# Patient Record
Sex: MALE | Race: WHITE | Employment: UNEMPLOYED | URBAN - METROPOLITAN AREA
[De-identification: names, ages, dates, MRNs, and addresses within clinical notes are randomized per-mention and may not be internally consistent; named-entity substitution may affect disease eponyms.]

---

## 2018-01-01 ENCOUNTER — HOSPICE ADMISSION (OUTPATIENT)
Dept: HOSPICE | Facility: HOSPICE | Age: 68
End: 2018-01-01
Payer: MEDICARE

## 2018-01-01 ENCOUNTER — HOSPITAL ENCOUNTER (INPATIENT)
Age: 68
LOS: 6 days | End: 2018-01-14
Attending: INTERNAL MEDICINE | Admitting: INTERNAL MEDICINE

## 2018-01-01 VITALS
HEART RATE: 104 BPM | DIASTOLIC BLOOD PRESSURE: 58 MMHG | HEIGHT: 73 IN | TEMPERATURE: 98.2 F | SYSTOLIC BLOOD PRESSURE: 116 MMHG | RESPIRATION RATE: 23 BRPM | WEIGHT: 176 LBS | BODY MASS INDEX: 23.33 KG/M2

## 2018-01-01 DIAGNOSIS — E08.11 DIABETES MELLITUS DUE TO UNDERLYING CONDITION WITH KETOACIDOTIC COMA, UNSPECIFIED LONG TERM INSULIN USE STATUS: ICD-10-CM

## 2018-01-01 DIAGNOSIS — M72.6 NECROTIZING FASCIITIS (HCC): ICD-10-CM

## 2018-01-01 PROCEDURE — 74011250636 HC RX REV CODE- 250/636: Performed by: NURSE PRACTITIONER

## 2018-01-01 PROCEDURE — 0656 HSPC GENERAL INPATIENT

## 2018-01-01 PROCEDURE — 74011000250 HC RX REV CODE- 250: Performed by: NURSE PRACTITIONER

## 2018-01-01 PROCEDURE — 74011250637 HC RX REV CODE- 250/637: Performed by: NURSE PRACTITIONER

## 2018-01-01 PROCEDURE — 3336500001 HSPC ELECTION

## 2018-01-01 PROCEDURE — 99231 SBSQ HOSP IP/OBS SF/LOW 25: CPT | Performed by: INTERNAL MEDICINE

## 2018-01-01 RX ORDER — METRONIDAZOLE 250 MG/1
1000 TABLET ORAL EVERY OTHER DAY
Status: DISCONTINUED | OUTPATIENT
Start: 2018-01-01 | End: 2018-01-01 | Stop reason: HOSPADM

## 2018-01-01 RX ORDER — HALOPERIDOL 5 MG/ML
2 INJECTION INTRAMUSCULAR
Status: DISCONTINUED | OUTPATIENT
Start: 2018-01-01 | End: 2018-01-01 | Stop reason: HOSPADM

## 2018-01-01 RX ORDER — MORPHINE SULFATE 4 MG/ML
4 INJECTION, SOLUTION INTRAMUSCULAR; INTRAVENOUS
Status: DISCONTINUED | OUTPATIENT
Start: 2018-01-01 | End: 2018-01-01 | Stop reason: HOSPADM

## 2018-01-01 RX ORDER — METRONIDAZOLE 250 MG/1
1000 TABLET ORAL DAILY
Status: DISCONTINUED | OUTPATIENT
Start: 2018-01-01 | End: 2018-01-01

## 2018-01-01 RX ORDER — FACIAL-BODY WIPES
10 EACH TOPICAL AS NEEDED
Status: DISCONTINUED | OUTPATIENT
Start: 2018-01-01 | End: 2018-01-01 | Stop reason: HOSPADM

## 2018-01-01 RX ORDER — METRONIDAZOLE 250 MG/1
1000 TABLET ORAL AS NEEDED
Status: DISCONTINUED | OUTPATIENT
Start: 2018-01-01 | End: 2018-01-01 | Stop reason: HOSPADM

## 2018-01-01 RX ORDER — SODIUM CHLORIDE 0.9 % (FLUSH) 0.9 %
3 SYRINGE (ML) INJECTION EVERY 12 HOURS
Status: DISCONTINUED | OUTPATIENT
Start: 2018-01-01 | End: 2018-01-01 | Stop reason: HOSPADM

## 2018-01-01 RX ORDER — GLYCOPYRROLATE 0.2 MG/ML
0.2 INJECTION INTRAMUSCULAR; INTRAVENOUS
Status: DISCONTINUED | OUTPATIENT
Start: 2018-01-01 | End: 2018-01-01 | Stop reason: HOSPADM

## 2018-01-01 RX ORDER — DOXYLAMINE SUCCINATE 25 MG
TABLET ORAL EVERY OTHER DAY
Status: DISCONTINUED | OUTPATIENT
Start: 2018-01-01 | End: 2018-01-01 | Stop reason: HOSPADM

## 2018-01-01 RX ORDER — SODIUM CHLORIDE 0.9 % (FLUSH) 0.9 %
3 SYRINGE (ML) INJECTION AS NEEDED
Status: DISCONTINUED | OUTPATIENT
Start: 2018-01-01 | End: 2018-01-01 | Stop reason: HOSPADM

## 2018-01-01 RX ORDER — DOXYLAMINE SUCCINATE 25 MG
TABLET ORAL DAILY
Status: DISCONTINUED | OUTPATIENT
Start: 2018-01-01 | End: 2018-01-01

## 2018-01-01 RX ORDER — FENTANYL 12.5 UG/1
1 PATCH TRANSDERMAL
Status: DISCONTINUED | OUTPATIENT
Start: 2018-01-01 | End: 2018-01-01

## 2018-01-01 RX ORDER — FENTANYL 25 UG/1
1 PATCH TRANSDERMAL
Status: DISCONTINUED | OUTPATIENT
Start: 2018-01-01 | End: 2018-01-01 | Stop reason: HOSPADM

## 2018-01-01 RX ORDER — LORAZEPAM 2 MG/ML
1 INJECTION INTRAMUSCULAR
Status: DISCONTINUED | OUTPATIENT
Start: 2018-01-01 | End: 2018-01-01 | Stop reason: HOSPADM

## 2018-01-01 RX ORDER — ACETAMINOPHEN 650 MG/1
650 SUPPOSITORY RECTAL
Status: DISCONTINUED | OUTPATIENT
Start: 2018-01-01 | End: 2018-01-01 | Stop reason: HOSPADM

## 2018-01-01 RX ORDER — DOXYLAMINE SUCCINATE 25 MG
TABLET ORAL AS NEEDED
Status: DISCONTINUED | OUTPATIENT
Start: 2018-01-01 | End: 2018-01-01 | Stop reason: HOSPADM

## 2018-01-01 RX ADMIN — MORPHINE SULFATE 4 MG: 4 INJECTION, SOLUTION INTRAMUSCULAR; INTRAVENOUS at 23:37

## 2018-01-01 RX ADMIN — MORPHINE SULFATE 4 MG: 4 INJECTION, SOLUTION INTRAMUSCULAR; INTRAVENOUS at 04:06

## 2018-01-01 RX ADMIN — Medication 3 ML: at 09:48

## 2018-01-01 RX ADMIN — Medication 3 ML: at 21:10

## 2018-01-01 RX ADMIN — MICONAZOLE NITRATE: 20 CREAM TOPICAL at 09:00

## 2018-01-01 RX ADMIN — Medication 3 ML: at 22:47

## 2018-01-01 RX ADMIN — MORPHINE SULFATE 4 MG: 4 INJECTION, SOLUTION INTRAMUSCULAR; INTRAVENOUS at 03:53

## 2018-01-01 RX ADMIN — Medication 3 ML: at 09:00

## 2018-01-01 RX ADMIN — SODIUM CHLORIDE, PRESERVATIVE FREE 3 ML: 5 INJECTION INTRAVENOUS at 16:46

## 2018-01-01 RX ADMIN — SODIUM CHLORIDE, PRESERVATIVE FREE 3 ML: 5 INJECTION INTRAVENOUS at 03:54

## 2018-01-01 RX ADMIN — METRONIDAZOLE 1000 MG: 250 TABLET ORAL at 09:30

## 2018-01-01 RX ADMIN — MICONAZOLE NITRATE: 20 CREAM TOPICAL at 09:34

## 2018-01-01 RX ADMIN — MICONAZOLE NITRATE: 20 CREAM TOPICAL at 10:00

## 2018-01-01 RX ADMIN — MORPHINE SULFATE 4 MG: 4 INJECTION, SOLUTION INTRAMUSCULAR; INTRAVENOUS at 05:19

## 2018-01-01 RX ADMIN — MORPHINE SULFATE 4 MG: 4 INJECTION, SOLUTION INTRAMUSCULAR; INTRAVENOUS at 03:38

## 2018-01-01 RX ADMIN — MORPHINE SULFATE 4 MG: 4 INJECTION, SOLUTION INTRAMUSCULAR; INTRAVENOUS at 16:45

## 2018-01-01 RX ADMIN — MORPHINE SULFATE 4 MG: 4 INJECTION, SOLUTION INTRAMUSCULAR; INTRAVENOUS at 18:35

## 2018-01-01 RX ADMIN — MORPHINE SULFATE 4 MG: 4 INJECTION, SOLUTION INTRAMUSCULAR; INTRAVENOUS at 04:26

## 2018-01-01 RX ADMIN — MORPHINE SULFATE 4 MG: 4 INJECTION, SOLUTION INTRAMUSCULAR; INTRAVENOUS at 03:16

## 2018-01-01 RX ADMIN — METRONIDAZOLE 1000 MG: 250 TABLET ORAL at 10:02

## 2018-01-01 RX ADMIN — HALOPERIDOL LACTATE 2 MG: 5 INJECTION INTRAMUSCULAR at 16:53

## 2018-01-01 RX ADMIN — MORPHINE SULFATE 4 MG: 4 INJECTION, SOLUTION INTRAMUSCULAR; INTRAVENOUS at 22:13

## 2018-01-01 RX ADMIN — METRONIDAZOLE 1000 MG: 250 TABLET ORAL at 10:08

## 2018-01-01 RX ADMIN — HALOPERIDOL LACTATE 2 MG: 5 INJECTION INTRAMUSCULAR at 09:46

## 2018-01-01 RX ADMIN — MORPHINE SULFATE 4 MG: 4 INJECTION, SOLUTION INTRAMUSCULAR; INTRAVENOUS at 09:46

## 2018-01-01 RX ADMIN — Medication 3 ML: at 09:57

## 2018-01-01 RX ADMIN — Medication 3 ML: at 22:51

## 2018-01-01 RX ADMIN — MORPHINE SULFATE 4 MG: 4 INJECTION, SOLUTION INTRAMUSCULAR; INTRAVENOUS at 16:53

## 2018-01-01 RX ADMIN — SODIUM CHLORIDE, PRESERVATIVE FREE 3 ML: 5 INJECTION INTRAVENOUS at 18:10

## 2018-01-01 RX ADMIN — METRONIDAZOLE 1000 MG: 250 TABLET ORAL at 09:47

## 2018-01-01 RX ADMIN — SODIUM CHLORIDE, PRESERVATIVE FREE 3 ML: 5 INJECTION INTRAVENOUS at 18:36

## 2018-01-01 RX ADMIN — MORPHINE SULFATE 4 MG: 4 INJECTION, SOLUTION INTRAMUSCULAR; INTRAVENOUS at 18:10

## 2018-01-01 RX ADMIN — MORPHINE SULFATE 4 MG: 4 INJECTION, SOLUTION INTRAMUSCULAR; INTRAVENOUS at 05:20

## 2018-01-01 RX ADMIN — HALOPERIDOL LACTATE 2 MG: 5 INJECTION INTRAMUSCULAR at 16:45

## 2018-01-01 RX ADMIN — Medication 3 ML: at 20:23

## 2018-01-01 RX ADMIN — MORPHINE SULFATE 4 MG: 4 INJECTION, SOLUTION INTRAMUSCULAR; INTRAVENOUS at 06:06

## 2018-01-01 RX ADMIN — Medication 3 ML: at 09:37

## 2018-01-01 RX ADMIN — Medication 3 ML: at 21:00

## 2018-01-01 RX ADMIN — Medication 3 ML: at 20:31

## 2018-01-08 PROBLEM — M72.6 NECROTIZING FASCIITIS (HCC): Status: ACTIVE | Noted: 2018-01-01

## 2018-01-09 NOTE — PROGRESS NOTES
Problem: Imminent death  Goal: Collaborate with patient/family/caregiver/interdisciplinary team to minimize and manage end of life symptoms  Outcome: Progressing Towards Goal  Pt/Family have been instructed on the dying process, the associated sings and symptoms, strategies to reduce associated stress of the dying process, and what to do at time of death. Family has made appropriate arrangements and is grieving appropriately. Blue book provided and all interventions below have been reviewed.

## 2018-01-09 NOTE — H&P
History and Physical    Patient: Scottie Hercules MRN: [id number]  SSN: [id number]    YOB: 1950  Age: 74 y.o. Sex: male      Subjective:      Scottie Hercules is a 74 y.o. male who sustained a a left brain CVA in October of 2017 that resulted in significant functional and cognitive deficits resulting in right hemiparesis, dysphagia (requiring a PEG tube), and aphasia. He has been living at a nursing home where her acquired a right trochanteric decubitus stage III ulcer and was being followed at [facility] wound center. He was sent to the hospital for further evaluation once there was air and fluid pockets in the wound extending into his thigh. Antibiotic therapy was started but per his brother and sister in [place] wishes they were stopped given that without surgical intervention for which he is not a surgical candidate, the abx were futile. As such, he has been admitted GIP for dx of right hip/thigh necrotizing fasciitis for management of pain, dyspnea, agitation, wound care, and family/pt support. Past Medical History:   Diagnosis Date    CAD (coronary artery disease)     Congestive heart failure, unspecified     Dental disorder     Diabetes ([address]     Heart failure ([facility] [facility]     Hypertension     Ill-defined condition     mitral regurg     Past Surgical History:   Procedure Laterality Date    HX HEART CATHETERIZATION  6/11/2014    multi-vessel with re-evaluate for CABG in future      Family History   Problem Relation Age of Onset    Cancer Father     Stroke Brother     Heart Attack Paternal Grandfather     Heart Disease Paternal Grandfather      Social History   Substance Use Topics    Smoking status: Former Smoker     Packs/day: 1.00     Years: 40.00     Quit date: 1/1/2014    Smokeless tobacco: Former User     Quit date: 6/21/2014    Alcohol use No      Prior to Admission medications    Medication Sig Start Date End Date Taking?  Authorizing Provider   amLODIPine (NORVASC) 5 mg tablet Take 5 mg by mouth daily. Historical Provider   SITagliptin (JANUVIA) 50 mg tablet Take 50 mg by mouth daily. Historical Provider   rosuvastatin (CRESTOR) 20 mg tablet Take 20 mg by mouth nightly. Historical Provider   glimepiride (AMARYL) 4 mg tablet Take 4 mg by mouth every morning. Historical Provider   insulin detemir (LEVEMIR FLEXTOUCH) 100 unit/mL (3 mL) inpn 17 Units by SubCUTAneous route nightly. Historical Provider   carvedilol (COREG) 25 mg tablet Take 25 mg by mouth two (2) times daily (with meals). Historical Provider   furosemide (LASIX) 80 mg tablet 1 po daily 6/13/14   FERCHO Katz   lisinopril (PRINIVIL, ZESTRIL) 10 mg tablet Take 1 Tab by mouth daily. Patient taking differently: Take 40 mg by mouth daily. 6/13/14   FERCHO Katz   nitroglycerin (NITROSTAT) 0.4 mg SL tablet 1 Tab by SubLINGual route every five (5) minutes as needed for Chest Pain. 6/13/14   FERCHO Katz   aspirin (ASPIRIN) 325 mg tablet Take 325 mg by mouth daily. Phys MD Ed        No Known Allergies    Review of Systems:  Review of systems not obtained due to patient factors. Objective: There were no vitals filed for this visit. Physical Exam:  GENERAL: mild distress, toxic, cachectic  LUNG: diminished breath sounds anterior  HEART: regular rate and rhythm  ABDOMEN: soft, non-tender. Bowel sounds normal. No masses,  no organomegaly  EXTREMITIES:  extremities normal, atraumatic, no cyanosis or edema x3. Right upper leg/thigh grossly edematous with foul smelling wound and copious drainage with crepitus on palpation  SKIN: Multiple areas of skin breakdown to buttock, heel, sacrum. See NN for measurements. NEUROLOGIC: Poorly responsive. Unable to determine orientation. Does not participate in exam. Does not respond to pain.        Assessment:     Hospital Problems  Date Reviewed: 1/8/2018          Codes Class Noted POA    * (Principal)Necrotizing fasciitis ([facility] [address] ICD-10-CM: M72.6  ICD-9-CM: 728.86  1/8/2018 Yes    Overview Signed 1/8/2018  5:44 PM by Mark Vyas NP     Right hip/thigh. Plan:     Current Facility-Administered Medications   Medication Dose Route Frequency    sodium chloride (NS) flush 3 mL  3 mL IntraVENous Q12H    sodium chloride (NS) flush 3 mL  3 mL IntraVENous PRN    morphine injection 4 mg  4 mg SubCUTAneous Q20MIN PRN    Or    morphine injection 4 mg  4 mg IntraVENous Q20MIN PRN    acetaminophen (TYLENOL) suppository 650 mg  650 mg Rectal Q3H PRN    LORazepam (ATIVAN) injection 1 mg  1 mg IntraVENous Q4H PRN    bisacodyl (DULCOLAX) suppository 10 mg  10 mg Rectal PRN    haloperidol lactate (HALDOL) injection 2 mg  2 mg SubCUTAneous Q1H PRN    Or    haloperidol lactate (HALDOL) injection 2 mg  2 mg IntraVENous Q1H PRN    glycopyrrolate (ROBINUL) injection 0.2 mg  0.2 mg IntraVENous Q4H PRN     1. Admitted GIP for dx of right hip/thigh necrotizing fasciitis for management of pain, dyspnea, agitation, wound care, and family/pt support. 2. Pain: Morphine 4 mg SQ or IV PRN. 3. Dyspnea: Morphine as indicated above. Oxygen PRN. 4. Agitation: Haldol IV or SQ PRN. 5. Wound care: as ordered. 6. Family/pt support: No family at bedside. Discussed plan of care including meds at bedside with primary RN. Continue to monitor and palliate symptoms as they arise. PPS 10%.        Signed By: Mark Vyas NP     January 8, 2018

## 2018-01-09 NOTE — PROGRESS NOTES
01/09/18 1348   Pyschosocial Assessment 1   Concerns from previous vist? No  (initial SW assessment)   Significant changes in relationships or household members? No   Signs of abuse or neglect? No   Financial Need Medicaid Application;Community Resources  (Pt lived an indigent life according to his brother )   Pain signs needing to be reported to  No   Psychosocial Components/Teaching/Interventions Emotional support/supportive listening;Provided requested forms/applications; Instructed on how to contact the agency with concerns; Other (comment)  (community resources )   The patient has designated their health care surrogate Patient has not made wishes known  (NO HCPOA on file )     Visit Environment: LMSW received a phone call from Roderick Lundborg the pt's brother. He and I completed the SW assessment over the phone. Circumstances for Admission: Pt was admitted from [facility] on 1-8-18. He was transported via ambulance to room 123. Please see physician statement below:    DX: necrotizing fasciitis of the right leg  ASSOCIATED: sepsis, fever, leukocytosis, Stage III decubitus right trochanter, acute renal failure    NON-ASSOCIATED: atherosclerotic heart disease, CHF, hypertension, insulin requiring Type II diabetes, sequelae of left MCA stroke, left hemiparesis, dysphagia, PEG tube feeding and hydration dependent  Nina Marshall: This is an order to admit to in patient hospice care, for a first 90 day benefit interval, a 74year old man who is suffering necrotizing fasciitis and sepsis as a delayed complication of left brain stroke in October of 2017. The patients right hemiparesis and aphasia and dysphagia have necessitated residential care at a nursing home. He acquired a right trochanteric decubitus Stage III and has been following at the [facility] wound center. Exam during routine wound center follow-up on 1/4/2018 demonstrated tunneling from the margins of the trochanteric ulcer.  He was transferred to [facility] where imaging studies demonstrated air and fluid pockets in the subcutaneous areas and fascial plane. He was started aggressive broad spectrum antibiotic therapy; however, antibiotics were stopped (1/7/2018) when patients brother and sister in law as substitute decision makers recognized that antibiotics without surgery was a futile intervention. The patient is not a surgical candidate due to recent stroke, debility and history of CAD. Insulin dependent diabetes, acute renal failure, hyperkalemia, PEG feeding dependent are diagnoses apart from necrotizing fasciitis with associated sepsis and multi organ failure which none the less contributed adversely to his poor prognosis. Off antibiotic therapy and artificial hydration and nutrition this patients life expectancy is less than 5 days. He is continuing to require parenteral opioid for pain and dyspnea as well as psychotropic medication for agitation. Patient's Previous Hospice Care: This is the pt's 1st hospice benefit period. 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 He has a psychology degree and one day he just quit his job and according to his brother Ekaterina Espinal" he lived and indigent life. \"      Spiritual Assessment: [address] for the Past Year and [place] of Care/Primary Caregivers: Pt's brother took him in so he would not be homeless. Prior to admission he was at a Nursing home for rehab for his stroke he had in October 2017. Patient's Cognitive  And Emotional Status Pt is obtunded         Advance Directives and DNR Status Pt has no HCPOA on file but he is a DNR family needed resources for direct cremation Family chose [place]     Risk of Harm: The patient is not a harm to himself or others. Bereavement Risk Assessment, Tracy Farfan is very matter of fact and he seems to be coping well. Low risk       Plan for patient. There are no D/C plans for the pt at this time.  Pt is nearing his demise.

## 2018-01-09 NOTE — PROGRESS NOTES
Received report from off going RN and assumed care of patient. Patient identified by name and . Patient in bed resting with eyes closed. No s/sx of distress noted at this time. This nurse not present when patient arrived to unit. Assessment in progress.

## 2018-01-09 NOTE — PROGRESS NOTES
Problem: Falls - Risk of  Goal: *Absence of Falls  Document Keila Fall Risk and appropriate interventions in the flowsheet.    Outcome: Progressing Towards Goal  Fall Risk Interventions:            Medication Interventions: Bed/chair exit alarm    Elimination Interventions: Bed/chair exit alarm, Call light in reach

## 2018-01-09 NOTE — PROGRESS NOTES
Pt identified. Pt in bed with eyes closed. Pt displaying no signs or symptoms of pain, dyspnea, agitation,nausea, or vomiting. Flacc 0/10. Bed locked and low, side rails up, tabs/bed alarm in place, call light with in reach, and door opened for continued monitoring. Pt repositioned and brief checked or changed.

## 2018-01-10 NOTE — PROGRESS NOTES
ID, bedside report. FLACC 0.  Rr 16, easy. Skin flushed, warm. Dressing right hip dry, intact. Unaccompanied. 810- Assessment, ESAS, justino and fall risk completed at this time. Positioned for comfort. All safety/fall precautions continued as per previous and assessment completed. Waller is patent. IV flushes easily. Peg dressing is dry, intact. He is unaccompanied. He has a large DTI on the buttocks over the gluteal crease. This measures 5.5 cm L x 3 cm W.  Skin intact. 930- Restful. He does not respond to turning at this time, or oral care. He has considerable yellow drainage from his left eye, which is cleansed with warm, wet cloth. He tolerates this well. 1105- Repositioned. Dressing changed, wound care. The wound has moderate amount creamy yellow, foul smelling discharge. Wound care as per order, and he tolerates this without any response. Positioned on his left side. Unaccompanied. 1235- Restful. FLACc 0.  Skin warm, dry. There remains considerable crepitus to right upper leg. He does not respond to palpation of this area. Cap refill < 3 seconds. + pedal pulses bilateral.     1410- Restful. FLACC 0.  Rr 16, easy. He does not respond to voice, touch. 1610- FLACC 0. Skin warm, dry. RR 16, easy. Unaccompanied. 1700- Family distressed as patient states she has pain. Dr. Spann Gains contacted for orders, rec'd.      1094- Pt continues to c/o pain, cannot state where. She is restless, RR 28. Flushed and moaning, grimacing. Family distressed but reassured and patient medicated, repositioned. 1810- Restful. FLACC 0.  RR 16, easy. Unaccompanied. I verify that a minimum of hourly rounds have been provided for this patient during this shift.

## 2018-01-10 NOTE — PROGRESS NOTES
Progress Note    Patient: Miriam Kurtz MRN: [id number]  SSN: [id number]    YOB: 1950  Age: 74 y.o. Sex: male      Admit Date: 1/8/2018    LOS: 2 days     Clinical Summary:     Tori Jarrett has recently been sedated and is poorly responsive. His wound continues to deteriorate but is less odiferous. Very little eating and drinking. No other changes in her breast examination. Vitals:    01/08/18 2112 01/09/18 0511 01/09/18 1648 01/10/18 0423   BP: 95/54 96/58 (!) 88/53 103/58   Pulse: (!) 119 (!) 119 (!) 113 (!) 110   Resp: 26 26 18 20   Temp: (!) 101.9 °F (38.8 °C) 99.9 °F (37.7 °C) 98.3 °F (36.8 °C) 98.2 °F (36.8 °C)   Weight:       Height:                Clinical Assessment:     Principal Problem:    Necrotizing fasciitis (HCC) (1/8/2018)      Overview: Right hip/thigh. Treatment Plan:      I have reviewed the patient's Plan of Care with the nursing staff. we will continue his comfort measures and no changes in his plan of treatment are required today. It is likely this week.           Current Facility-Administered Medications   Medication Dose Route Frequency    fentaNYL (DURAGESIC) 12 mcg/hr patch 1 Patch  1 Patch TransDERmal Q72H    metroNIDAZOLE (FLAGYL) tablet 1,000 mg  1,000 mg Topical DAILY    And    miconazole (MICOTIN) 2 % cream   Topical DAILY    metroNIDAZOLE (FLAGYL) tablet 1,000 mg  1,000 mg Topical PRN    And    miconazole (MICOTIN) 2 % cream   Topical PRN    sodium chloride (NS) flush 3 mL  3 mL IntraVENous Q12H    sodium chloride (NS) flush 3 mL  3 mL IntraVENous PRN    morphine injection 4 mg  4 mg SubCUTAneous Q20MIN PRN    Or    morphine injection 4 mg  4 mg IntraVENous Q20MIN PRN    acetaminophen (TYLENOL) suppository 650 mg  650 mg Rectal Q3H PRN    LORazepam (ATIVAN) injection 1 mg  1 mg IntraVENous Q4H PRN    bisacodyl (DULCOLAX) suppository 10 mg  10 mg Rectal PRN    haloperidol lactate (HALDOL) injection 2 mg  2 mg SubCUTAneous Q1H PRN    Or    haloperidol lactate (HALDOL) injection 2 mg  2 mg IntraVENous Q1H PRN    glycopyrrolate (ROBINUL) injection 0.2 mg  0.2 mg IntraVENous Q4H PRN           Signed By: Kamille Lyon MD     January 10, 2018

## 2018-01-10 NOTE — PROGRESS NOTES
STALIN visited with the pt in his room. He was asleep and resting comfortable at the time of my visit. LMSW provided a calm presence in his room. He did not wake during my visit.

## 2018-01-11 NOTE — PROGRESS NOTES
Situation: Francis Garzon is a 74year old male who  suffered  a left brain CVA in October of 2017 that resulted in significant functional and cognitive deficits resulting in right hemiparesis, dysphagia (requiring a PEG tube), and aphasia. He has been living at a nursing home where her acquired a right trochanteric decubitus stage III ulcer and was being followed at [facility] wound center. He was sent to the hospital for further evaluation once there was air and fluid pockets in the wound extending into his thigh. Antibiotic therapy was started but per his brother and sister in [place] wishes they were stopped given that without surgical intervention for which he is not a surgical candidate, the abx were futile. He has admitted GIP for diagnosis of right hip/thigh necrotizing fasciitis for management of pain, dyspnea, agitation, wound care, and family/pt support.      Background: According to chart records patient is a [place]. He is supported by his brother and sister in law. His chart also list another relative, Trisha Sheets. Assessment:  offered the ministry of presence and prayer.  attempted to contact Roderick Lundborg via telephone. ( listed as other relative on the chart. Recommendation of Care:   to provide spiritual support in the form of a caring presence.  will continue to provide spiritual rituals: Prayer and scripture.  will try to reach family again via phone.

## 2018-01-11 NOTE — PROGRESS NOTES
Pt re-postitioned for comfort. No s/sx of pain, dyspnea or N/V noted at this time. Bed low and locked, SR up X 2, door open for monitoring.

## 2018-01-11 NOTE — PROGRESS NOTES
8202- The report was taken from the off going RN and walking rounds are completed. The patient was identified by name and . He shows no signs of anxiety, SOB, nausea / vomit or pain at this time. The following safety precautions are in effect: Bed low and locked, bed rails up times tab alerts in place, room close to the nurses station and the door to the room left open as patient is alone. Will continue to monitor. 0259- AM medications given. AM assessments completed. Patient continues to rest with no signs of distress. 1145- The patient continues to rest with no signs of distress observed. No signs of pain, no signs of anxiety and no signs of SOB or nausea / vomit. Will continue to monitor. 1400- The patient continues to rest with no s /sx of distress observed. No signs of anxiety, SOB, nausea/vomit or pain observed. Turned for comfort. All safety precautions remain in place. Will continue to monitor. 1600- The patient was turned and repositioned for comfort. He shows no signs of distress. He is obtunded. 1645- The patient was medicated with Morphine 4 mg for pain at dressing change and Haldol 2 mg for agitation with dressing change. Medicated IV and flushed as ordered. Old dressing removed and new dressing replaced as ordered. Will continue to monitor. Will report off to the on coming RN and completed walking rounds.

## 2018-01-11 NOTE — PROGRESS NOTES
Received report from off-going RN. Pt ID. Pt resting quietly in bed with no s/sx of pain, dyspnea, agitation or N/V at this time. Bed low and locked, SR x 2, door open for monitoring.

## 2018-01-12 NOTE — HSPC IDG CHAPLAIN NOTES
Patient: Fior Weir    Date: 01/12/18  Time: 1:26 PM    [facility]  Notes  Intervention:  Ministry of presence and prayer. Attempted to contact family. Outcome: Left message with family member via voice mail. No response as of yet. Patient is unresponsive. Plan of Care:  continues to be available for spiritual and emotional support.     Signed by: Suad Franklin

## 2018-01-12 NOTE — HSPC IDG VOLUNTEER NOTES
[address] Review     Status Codes I = Initiated C=Continued R=Revised RS = Resolved     I.  Volunteer     Goal: Hospice house volunteer (s) enhances the quality of remaining life while patient is at the hospice house. Interventions: Maico Webb Volunteer (s) will provide companionship to the patient and/or family by visiting at the hospice house       . Maico Webb Volunteer (s) will provide respite as needed when requested by patient and/or family. Maico Fragoso  Volunteer will provide activities such as music, reading, pet therapy, etc. as requested. Maico Bess Colace  Comfort bag delivered. Any other special requests or information regarding volunteer services:    Staff have requested extra visits for companionship and volunteers have been notified. No further needs identified at this time. These notes have been discussed in [facility] meeting.         Signed by: Ruddy Hyde

## 2018-01-12 NOTE — HSPC IDG NURSE NOTES
Patient: Blake Valente    Date: 01/12/18  Time: 1:33 PM    [facility] Nurse Notes    UPDATE: Patient is a 74year old Male patient, with diagnosis of Necrotizing Fascititis, on GIP level of care for Pain, Dyspnea, Agitation and Wound Management. Dressing changed to every other day to Right Trochanter. GOALS OF CARE:   Continue to monitor for optimal patient comfort and symptom management.              Signed by: Merari Centeno RN MSN

## 2018-01-12 NOTE — HSPC IDG SOCIAL WORKER NOTES
Patient: Mynor Dudley    Date: 01/12/18  Time: 1:30 PM    [facility]  Notes    LMSW will continue to provide emotional support. Pt seems to be unaccompanied a lot. One brother is in [place] caring for their mother. He will be back at the end of January.       Signed by: Ha Nixon

## 2018-01-12 NOTE — PROGRESS NOTES
0650-The shift change report taken and walking rounds completed. Patient identified by name and . The patient shows no signs of anxiety, SOB, nausea/vomit or pain. The following safety precautions are in place: Side rails up times 2, bed low and locked, tab alert in place and the door to the patient's room left open when patient is alone. Will continue to monitor. 0900- The patient is resting quietly and shows no signs of distress at this time. Mouth care completed and wounds creamed. Patient was repositioned to the right side with pillows used for positioning. Will continue to monitor. 1130- The patient continues to rest without responding. He continues to show no signs of distress. Will continue to monitor. Repositioned by to supine position. Safety precautions remain in place. 1400- The patient was turned and repositioned in the bed for comfort. He shows no signs of distress. No response to being repositioned. He is on the left side. Creamed his buttocks, back and heels with cream as ordered. 1700- The patient was repositioned for comfort. He continues to show no signs of distress. Will report off to the on coming RN and will complete walking rounds.

## 2018-01-13 NOTE — PROGRESS NOTES
1837- The shift change rounds completed and walking rounds completed. The patient was identified by name and . He shows no signs of distress. No signs of pain, SOB, nausea/vomit or anxiety. The bed is low and locked and the door to the room is open. Bed rails are up times two and the tab alert is in place. Will continue to monitor. 0800- All AM assessments are completed. The patient remains lethargic but not obtunded at this time. Will continue to monitor. 0945- The patient is moaning with respirations increased to 30 / minute. Administered Haldol IV for restlessness and Morphine IV of increased respirations. Incontinent bowel movement loose. Cleaned robert area and repositioned. Repositioned for comfort. Changed dressing to right thigh as ordered. Patient tolerated well. Will continue to monitor. 1005- The patient is now resting quietly in the bed with no signs of SOB or restlessness present. Respirations are at 15 minute now. 1300- Turned and repositioned in the bed. No signs of anxiety, SOB, nausea/vomit or pain. Did not respond at all to repositioning. 1545- No changes. Remains unresponsive. No signs of distress observed. Repositioned for comfort. Mouth care completed. 1810- Medicated for dyspnea and repositioned in the bed for comfort. Did not respond. Cleaned mouth and eyes. Eyes have green drainage. 1835- Morphine administered again for dyspnea. Will continue to monitor. Reported off to the on coming RN and walking rounds completed.

## 2018-01-13 NOTE — PROGRESS NOTES
Remains minimally responsive this am. Required two doses of Morphine IV overnight for dyspnea. Meds effective. Had bath last pm with buttocks,heels and back creamed as ordered. Repositioned throughout the night for comfort and pressure offsetting. Bed in low and locked position with door remaining open as pt is unaccompanied.

## 2018-01-13 NOTE — HOSPICE
Rounding preformed report received from Nurse [facility] reviewed. History and physical reviewed. Patient with increased need for medication for pain last night. But has responded better this morning. No changes in current regimen will continue to palliate. No family today.      MARIVEL Quispe

## 2018-01-14 NOTE — PROGRESS NOTES
Problem: Falls - Risk of  Goal: *Absence of Falls  Document Keila Fall Risk and appropriate interventions in the flowsheet.    Outcome: Progressing Towards Goal  Fall Risk Interventions:            Medication Interventions: Bed/chair exit alarm    Elimination Interventions: Bed/chair exit alarm, Call light in reach, Toileting schedule/hourly rounds    History of Falls Interventions: Bed/chair exit alarm, Door open when patient unattended

## 2018-01-15 NOTE — [facility] IDG MASTER NOTE
Hospice Interdisciplinary Group Collaborative  Date: 01/12/2018  Time: 1300 pm    ___________________    Patient: Bao Galloway  Insurance ID: [unfilled]  MRN: [id number]        ___________________    Diagnoses:  Diagnoses of Necrotizing fasciitis ([facility] and Diabetes mellitus due to underlying condition with ketoacidotic coma, unspecified long term insulin use status ([facility] were pertinent to this visit. Current Medications:  No current facility-administered medications for this encounter. Current Outpatient Prescriptions:     amLODIPine (NORVASC) 5 mg tablet, Take 5 mg by mouth daily. , Disp: , Rfl:     SITagliptin (JANUVIA) 50 mg tablet, Take 50 mg by mouth daily. , Disp: , Rfl:     rosuvastatin (CRESTOR) 20 mg tablet, Take 20 mg by mouth nightly., Disp: , Rfl:     glimepiride (AMARYL) 4 mg tablet, Take 4 mg by mouth every morning., Disp: , Rfl:     insulin detemir (LEVEMIR FLEXTOUCH) 100 unit/mL (3 mL) inpn, 17 Units by SubCUTAneous route nightly., Disp: , Rfl:     carvedilol (COREG) 25 mg tablet, Take 25 mg by mouth two (2) times daily (with meals). , Disp: , Rfl:     furosemide (LASIX) 80 mg tablet, 1 po daily, Disp: 30 Tab, Rfl: 0    lisinopril (PRINIVIL, ZESTRIL) 10 mg tablet, Take 1 Tab by mouth daily. (Patient taking differently: Take 40 mg by mouth daily.), Disp: 30 Tab, Rfl: 0    nitroglycerin (NITROSTAT) 0.4 mg SL tablet, 1 Tab by SubLINGual route every five (5) minutes as needed for Chest Pain., Disp: 1 Bottle, Rfl: 0    aspirin (ASPIRIN) 325 mg tablet, Take 325 mg by mouth daily. , Disp: , Rfl:     Orders:  Orders Placed This Encounter    IP CONSULT TO SPIRITUAL CARE     Standing Status:   Standing     Number of Occurrences:   1     Order Specific Question:   Reason for Consult: Answer: Once on week one, then PRN. For [facility] Patients Only. For contracted patients, their primary hospice will continue to manage spiritual care needs.     DISCONTD: sodium chloride (NS) flush 3 mL  DISCONTD: sodium chloride (NS) flush 3 mL    DISCONTD: morphine injection 4 mg    DISCONTD: morphine injection 4 mg    DISCONTD: acetaminophen (TYLENOL) suppository 650 mg    DISCONTD: LORazepam (ATIVAN) injection 1 mg    DISCONTD: bisacodyl (DULCOLAX) suppository 10 mg    DISCONTD: haloperidol lactate (HALDOL) injection 2 mg    DISCONTD: haloperidol lactate (HALDOL) injection 2 mg    DISCONTD: glycopyrrolate (ROBINUL) injection 0.2 mg    DISCONTD: fentaNYL (DURAGESIC) 12 mcg/hr patch 1 Patch    DISCONTD: metroNIDAZOLE (FLAGYL) tablet 1,000 mg     Order Specific Question:   Antibiotic Indications     Answer: Other     Order Specific Question:   Other Abx Indication     Answer:   necrotizing fasciitis    DISCONTD: miconazole (MICOTIN) 2 % cream    DISCONTD: metroNIDAZOLE (FLAGYL) tablet 1,000 mg     Order Specific Question:   Antibiotic Indications     Answer: Other     Order Specific Question:   Other Abx Indication     Answer:   necrotizing fasciitis    DISCONTD: miconazole (MICOTIN) 2 % cream    DISCONTD: metroNIDAZOLE (FLAGYL) tablet 1,000 mg     Order Specific Question:   Antibiotic Indications     Answer: Other     Order Specific Question:   Other Abx Indication     Answer:   necrotizing fasciitis    DISCONTD: miconazole (MICOTIN) 2 % cream    DISCONTD: fentaNYL (DURAGESIC) 25 mcg/hr patch 1 Patch     Place new today    INITIAL PHYSICIAN ORDER: HOSPICE Level Of Care: General; Reason for Admission: Admit GIP for dx of right hip/thigh necrotizing fasciitis for management of pain, dyspnea, agitation, and family/pt support.      Standing Status:   Standing     Number of Occurrences:   1     Order Specific Question:   Status     Answer:   Hospice     Order Specific Question:   Level Of Care     Answer:   General     Order Specific Question:   Reason for Admission     Answer:   Admit GIP for dx of right hip/thigh necrotizing fasciitis for management of pain, dyspnea, agitation, and family/pt support. Order Specific Question:   Inpatient Hospitalization Certified Necessary for the Following Reasons     Answer:   3. Patient receiving treatment that can only be provided in an inpatient setting (further clarification in H&P documentation)     Order Specific Question:   Admitting Diagnosis     Answer:   Necrotizing fasciitis ([facility] [728.86. ICD-9-CM]     Order Specific Question:   Terminal Prognosis Diagnosis(es)     Answer:   Pain [057158]     Order Specific Question:   Terminal Prognosis Diagnosis(es)     Answer:   Agitation [493270]     Order Specific Question:   Admitting Physician     Answer:   Florence Cummings     Order Specific Question:   Attending Physician     Answer:   Mallshilo Patches     Order Specific Question:   Discharge Plan:     Answer: Other (Specify)     Comments:   Anticipate demise at [facility]. Allergies:  No Known Allergies    Care Plan:       Multidisciplinary Problems (Active)           Problem: Communication Deficit     Dates: Start: 01/09/18       Disciplines: Interdisciplinary    Goal: Effectively communicate symptoms, needs, and concerns     Dates: Start: 01/09/18   Expected End: 01/31/18      Disciplines: Interdisciplinary   Intervention: Assess spiritual needs    Dates: Start: 01/09/18      Intervention: Expression of emotions    Dates: Start: 01/09/18      Intervention: Instruct end of life support    Dates: Start: 01/09/18       Description: Assess mental status and identify loss of short term memory, confusion, impaired cognitive ability, alertness, and orientation.           Problem: Community Resource Needs     Dates: Start: 01/09/18       Disciplines: Interdisciplinary    Goal: Patient is receiving increases resource supprot to enhance ability to remain at home     Dates: Start: 01/09/18   Expected End: 01/31/18      Disciplines: Interdisciplinary   Intervention: Provides assistance with identifying appropriate community resources    Dates: Start: 18       Description: Provide assistance with identifying appropriate community resources. Patient is receiving increased resource support to enhance ability to remain at home. Problem: Emotional Support Needs     Dates: Start: 18       Disciplines: Interdisciplinary    Goal: Patient/family is receiving emotional support     Dates: Start: 18   Expected End: 18      Disciplines: Interdisciplinary   Intervention: Assess for emotional distress    Dates: Start: 18      Intervention: Provide emotional support    Dates: Start: 18      Intervention: Provide emotional support of the family's cultural expressions of grief and loss    Dates: Start: 18       Description: Provide emotional support of the family's cultural expression of grief, loss, and response to illness. Problem: Falls - Risk of     Dates: Start: 18       Disciplines: Interdisciplinary    Goal: *Absence of Falls     Dates: Start: 18   Expected End: 18      Description: Document Susana Andrade Fall Risk and appropriate interventions in the flowsheet. Disciplines: Interdisciplinary         Problem: Imminent death     Dates: Start: 18       Disciplines: Interdisciplinary    Goal: Collaborate with patient/family/caregiver/interdisciplinary team to minimize and manage end of life symptoms     Dates: Start: 18   Expected End: 18      Disciplines: Interdisciplinary   Intervention: Attend  or visitation    Dates: Start: 18      Intervention: Nik Nevarez on end of life issues    Dates: Start: 18       Description: Nik Nevarez patient/caregiver on end of life issues. Intervention: Instruct on impending death    Dates: Start: 18       Description: Instruct on impending death, need for  arrangements, physical care, patient wishes, hospice role, and signs/symptoms of approaching death.     Intervention: Instruct on what to do at death    Dates: Start: 01/09/18       Description: Instruct caregiver on what to do at time of death. Intervention: Plan for death    Dates: Start: 01/09/18       Description: Assess patient/caregiver emotional readiness and plan for death and assist as needed.           Problem: Pain     Dates: Start: 01/14/18       Disciplines: Nurse, Interdisciplinary, RT    Goal: *Control of Pain     Dates: Start: 01/14/18      Disciplines: Nurse, Interdisciplinary, RT   Intervention: Assess pain characteristics (eg: Intensity scale; onset; location; quality; severity; duration; frequency; radiation)    Dates: Start: 01/14/18      Intervention: Assess pain management - barriers (eg: Past pain experiences)    Dates: Start: 01/14/18      Intervention: Identify pain expectations (eg: Patient's pain goal; somatic experiences; behavioral changes; affect)    Dates: Start: 01/14/18      Intervention: Identify pain medication concerns (eg: Cultural considerations; addiction concerns)    Dates: Start: 01/14/18      Intervention: Support system identification (eg: Caregiver; community resource; family; friends; Druze; support group)    Dates: Start: 01/14/18      Intervention: Monitor for change in patient condition (eg:  Vital signs changes; changes in level of consciousness; nausea; behavioral changes)    Dates: Start: 01/14/18      Intervention: Medication side-effect assessment    Dates: Start: 01/14/18      Intervention: Pain-relief response reassessment (eg: Frequency based on route of administration; effectiveness)    Dates: Start: 01/14/18          Goal: *PALLIATIVE CARE:  Alleviation of Pain     Dates: Start: 01/14/18      Disciplines: Nurse, Interdisciplinary, RT   Intervention: Refer patient for holistic services per facility    Dates: Start: 01/14/18            Problem: Patient Education: Go to Patient Education Activity     Dates: Start: 01/08/18       Disciplines: Interdisciplinary    Goal: Patient/Family Education     Dates: Start: 01/08/18 Expected End: 01/16/18      Disciplines: Interdisciplinary         Problem: Patient Education: Go to Patient Education Activity     Dates: Start: 01/08/18       Disciplines: Interdisciplinary    Goal: Patient/Family Education     Dates: Start: 01/08/18   Expected End: 01/16/18      Disciplines: Interdisciplinary         Problem: Patient Education: Go to Patient Education Activity     Dates: Start: 01/14/18       Disciplines: Nurse, Interdisciplinary, RT    Goal: Patient/Family Education     Dates: Start: 01/14/18      Disciplines: Nurse, Interdisciplinary, RT         Problem: Pressure Injury - Risk of     Dates: Start: 01/08/18       Disciplines: Interdisciplinary    Goal: *Prevention of pressure ulcer     Dates: Start: 01/08/18   Expected End: 01/16/18      Disciplines: Interdisciplinary   Intervention: Pressure injury risk assessment tool    Dates: Start: 01/08/18       Description: (e.g.: Moses Scale)    Intervention: Pressure-relieving device needs assessment    Dates: Start: 01/08/18      Intervention: Pressure-relieving device implementation (eg: Specialty beds; wheel chair cushions; heel lift boots)    Dates: Start: 01/08/18      Intervention: Skin assessment (e.g. existing ulcers, color; integrity; moisture; kristen prominences; blisters; friction/shear injuries)    Dates: Start: 01/08/18      Intervention: Skin monitoring and care (e.g. skin cleansing; keep dry, moisturize, utilization of  lotion and/or skin barrier cream)    Dates: Start: 01/08/18      Intervention: Blood glucose control (eg: Monitoring; medication; nutrition/hydration)    Dates: Start: 01/08/18      Intervention: Position change (eg: Techniques to position; turn and transfer per schedule; cushion kristen prominences; float heels; encourage mobility)    Dates: Start: 01/08/18      Intervention: Nutrition promotion (eg: Micro/macro nutrient supplementation; encourage oral intake; blood glucose control; nutrition support when indicated) Dates: Start: 01/08/18              ___________________    Care Team Notes          POC/IDG Notes      [facility] IDG Nurse Notes by Ge Heath at 01/12/18 1333  Version 1 of 1    Author:  Conception Ivana Service:  Ayde Salas Author Type:  Registered Nurse    Filed:  01/12/18 1338 Date of Service:  01/12/18 1333 Status:  Signed    :  Conception Ivana (Registered Nurse)           Patient: Marilee Space    Date: 01/12/18  Time: 1:33 PM    [facility] Nurse Notes    UPDATE: Patient is a 74year old Male patient, with diagnosis of Necrotizing Fascititis, on [facility] level of care for Pain, Dyspnea, Agitation and Wound Management. Dressing changed to every other day to Right Trochanter. GOALS OF CARE:   Continue to monitor for optimal patient comfort and symptom management. Signed by: Ge Heath RN MSN       [facility] ID  Notes by Amarjit Bentley at 01/12/18 1330  Version 1 of 1    Author:  Amarjit Bentley Service:  Licensed Clinical  Author Type:      Filed:  01/12/18 1337 Date of Service:  01/12/18 1330 Status:  Signed    :  Amarjit Bentley ()           Patient: Gwelaina Space    Date: 01/12/18  Time: 1:30 PM    [facility]  Notes    LMSW will continue to provide emotional support. Pt seems to be unaccompanied a lot. One brother is in [place] caring for their mother. He will be back at the end of January. Signed by: Amarjit Bentley       [facility] IDG  Notes by Scarlet Ly at 01/12/18 1326  Version 1 of 1    Author:  Scarlet Ly Service:  Spiritual Care Author Type:  Pastoral Care    Filed:  01/12/18 1331 Date of Service:  01/12/18 1326 Status:  Signed    :  Scarlet Ly ([address])           Patient: Gwelaina Space    Date: 01/12/18  Time: 1:26 PM    [facility]  Notes  Intervention:  Ministry of presence and prayer. Attempted to contact family.   Outcome: Left message with family member via voice mail.  No response as of yet. Patient is unresponsive. Plan of Care:  continues to be available for spiritual and emotional support. Signed by: Tato CAMPBELL [facility] IDG Volunteer Notes by Jaki Hunter at 01/12/18 1201  Version 1 of 1    Author:  Jaki Hunter Service:  Cleve Bishop Author Type:  Hospice Volunteer/    Filed:  01/12/18 1202 Date of Service:  01/12/18 1201 Status:  Signed    :  Jaki Hunter (Hospice Volunteer/)               [facility] Interdisciplinary Plan of Care Review     Status Codes I = Initiated C=Continued R=Revised RS = Resolved     I.  Volunteer     Goal: Hospice house volunteer (s) enhances the quality of remaining life while patient is at the hospice house. Interventions: Dee Lilly Volunteer (s) will provide companionship to the patient and/or family by visiting at the hospice house       . Dee Lilly Volunteer (s) will provide respite as needed when requested by patient and/or family. Dee Novak  Volunteer will provide activities such as music, reading, pet therapy, etc. as requested. Dee Novak  Comfort bag delivered. Any other special requests or information regarding volunteer services:    Staff have requested extra visits for companionship and volunteers have been notified. No further needs identified at this time. These notes have been discussed in [facility] meeting.         Signed by: Jaki Hunter